# Patient Record
Sex: FEMALE | Race: WHITE
[De-identification: names, ages, dates, MRNs, and addresses within clinical notes are randomized per-mention and may not be internally consistent; named-entity substitution may affect disease eponyms.]

---

## 2021-05-19 ENCOUNTER — HOSPITAL ENCOUNTER (INPATIENT)
Dept: HOSPITAL 96 - M.ERS | Age: 81
LOS: 2 days | Discharge: NURSING, REHAB OR LONG-TERM CARE | DRG: 542 | End: 2021-05-21
Attending: INTERNAL MEDICINE | Admitting: INTERNAL MEDICINE
Payer: MEDICARE

## 2021-05-19 VITALS — DIASTOLIC BLOOD PRESSURE: 87 MMHG | SYSTOLIC BLOOD PRESSURE: 160 MMHG

## 2021-05-19 VITALS — SYSTOLIC BLOOD PRESSURE: 157 MMHG | DIASTOLIC BLOOD PRESSURE: 75 MMHG

## 2021-05-19 VITALS — HEIGHT: 65.98 IN | WEIGHT: 216.01 LBS | BODY MASS INDEX: 34.72 KG/M2

## 2021-05-19 VITALS — DIASTOLIC BLOOD PRESSURE: 81 MMHG | SYSTOLIC BLOOD PRESSURE: 155 MMHG
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
## 2021-05-19 DIAGNOSIS — N17.0: ICD-10-CM
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
## 2021-05-19 DIAGNOSIS — Z20.822: ICD-10-CM

## 2021-05-19 DIAGNOSIS — S82.302A: ICD-10-CM

## 2021-05-19 DIAGNOSIS — Z98.42: ICD-10-CM

## 2021-05-19 DIAGNOSIS — Z79.899: ICD-10-CM

## 2021-05-19 DIAGNOSIS — M80.062A: Primary | ICD-10-CM

## 2021-05-19 DIAGNOSIS — N39.0: ICD-10-CM

## 2021-05-19 DIAGNOSIS — F03.90: ICD-10-CM

## 2021-05-19 DIAGNOSIS — I10: ICD-10-CM
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
## 2021-05-19 DIAGNOSIS — M19.90: ICD-10-CM

## 2021-05-19 DIAGNOSIS — E11.9: ICD-10-CM

## 2021-05-19 DIAGNOSIS — S82.402A: ICD-10-CM

## 2021-05-19 LAB
ABSOLUTE BASOPHILS: 0 THOU/UL (ref 0–0.2)
ABSOLUTE EOSINOPHILS: 0.2 THOU/UL (ref 0–0.7)
ABSOLUTE MONOCYTES: 0.4 THOU/UL (ref 0–1.2)
ALBUMIN SERPL-MCNC: 3.4 G/DL (ref 3.4–5)
ALP SERPL-CCNC: 90 U/L (ref 46–116)
ALT SERPL-CCNC: 16 U/L (ref 30–65)
ANION GAP SERPL CALC-SCNC: 4 MMOL/L (ref 7–16)
APTT BLD: 24.5 SECONDS (ref 25–31.3)
AST SERPL-CCNC: 11 U/L (ref 15–37)
BASOPHILS NFR BLD AUTO: 0.4 %
BILIRUB SERPL-MCNC: 0.5 MG/DL
BILIRUB UR-MCNC: NEGATIVE MG/DL
BUN SERPL-MCNC: 32 MG/DL (ref 7–18)
CALCIUM SERPL-MCNC: 8.2 MG/DL (ref 8.5–10.1)
CHLORIDE SERPL-SCNC: 106 MMOL/L (ref 98–107)
CO2 SERPL-SCNC: 33 MMOL/L (ref 21–32)
COLOR UR: YELLOW
CREAT SERPL-MCNC: 1.8 MG/DL (ref 0.6–1.3)
EOSINOPHIL NFR BLD: 2.4 %
GLUCOSE SERPL-MCNC: 128 MG/DL (ref 70–99)
GRANULOCYTES NFR BLD MANUAL: 79.8 %
HCT VFR BLD CALC: 42.2 % (ref 37–47)
HGB BLD-MCNC: 14.3 GM/DL (ref 12–15)
INR PPP: 1
KETONES UR STRIP-MCNC: NEGATIVE MG/DL
LYMPHOCYTES # BLD: 0.7 THOU/UL (ref 0.8–5.3)
LYMPHOCYTES NFR BLD AUTO: 11.5 %
MCH RBC QN AUTO: 32.6 PG (ref 26–34)
MCHC RBC AUTO-ENTMCNC: 34 G/DL (ref 28–37)
MCV RBC: 95.8 FL (ref 80–100)
MONOCYTES NFR BLD: 5.9 %
MPV: 8.3 FL. (ref 7.2–11.1)
NEUTROPHILS # BLD: 4.9 THOU/UL (ref 1.6–8.1)
NUCLEATED RBCS: 0 /100WBC
PLATELET COUNT*: 141 THOU/UL (ref 150–400)
POTASSIUM SERPL-SCNC: 3.9 MMOL/L (ref 3.5–5.1)
PROT SERPL-MCNC: 6.8 G/DL (ref 6.4–8.2)
PROT UR QL STRIP: (no result)
PROTHROMBIN TIME: 10.9 SECONDS (ref 9.2–11.5)
RBC # BLD AUTO: 4.4 MIL/UL (ref 4.2–5)
RBC # UR STRIP: (no result) /UL
RBC #/AREA URNS HPF: (no result) /HPF (ref 0–2)
RDW-CV: 15.6 % (ref 10.5–14.5)
SODIUM SERPL-SCNC: 143 MMOL/L (ref 136–145)
SP GR UR STRIP: 1.02 (ref 1–1.03)
SQUAMOUS: (no result) /LPF (ref 0–3)
URINE CLARITY: CLEAR
URINE GLUCOSE-RANDOM: NEGATIVE
URINE LEUKOCYTES-REFLEX: (no result)
URINE NITRITE-REFLEX: NEGATIVE
URINE WBC-REFLEX: (no result) /HPF (ref 0–5)
UROBILINOGEN UR STRIP-ACNC: 0.2 E.U./DL (ref 0.2–1)
WBC # BLD AUTO: 6.2 THOU/UL (ref 4–11)

## 2021-05-19 NOTE — EKG
[place] [address]
Phone:  [phone]                     ELECTROCARDIOGRAM REPORT      
_______________________________________________________________________________
 
Name:         RICHA WASHINGTON            Room:          [address] [place] 
[facility]#:    [id number]     Account #:     [id number]  
Admission:    21    Attend Phys:   Arie Robb
Discharge:                Date of Birth: [birth year]  
Date of Service: 21 1313  Report #:      [id number]
        [id number]
_______________________________________________________________________________
THIS REPORT FOR:  //name//                      
 
                         [facility] ED
                                       
Test Date:    2021               Test Time:    13:13:39
Pat Name:     RICHA WASHINGTON         Department:   
Patient ID:   [id number]            Room:         [facility]
Gender:       F                        Technician:   CD
:          1940               Requested By: Elver Adkins
Order Number: [id number] MD:   Cleveland Edwards
                                 Measurements
Intervals                              Axis          
Rate:         76                       P:            70
WY:           186                      QRS:          -39
QRSD:         97                       T:            56
QT:           448                                    
QTc:          504                                    
                           Interpretive Statements
Sinus rhythm with atrial paced beat
Left axis deviation
Low voltage, extremity leads
Abnormal R-wave progression, late transition
 
Compared to ECG 2008 07:26:01
Left-axis deviation now present
Low QRS voltage now present
Prolonged QT interval now present
atrial paced beat noted
Electronically Signed On 2021 15:57:43 CDT by Cleveland Edwards
[email or web address]
 
 
 
 
 
 
 
 
 
 
 
 
 
 
  <ELECTRONICALLY SIGNED>
                                           By: Cleveland Edwards MD, [facility]      
  21     1557
D: 21 1313   _____________________________________
T: 21 1313   Cleveland Edwards MD, [facility]        /EPI

## 2021-05-20 VITALS — DIASTOLIC BLOOD PRESSURE: 75 MMHG | SYSTOLIC BLOOD PRESSURE: 162 MMHG

## 2021-05-20 VITALS — DIASTOLIC BLOOD PRESSURE: 83 MMHG | SYSTOLIC BLOOD PRESSURE: 176 MMHG

## 2021-05-20 VITALS — SYSTOLIC BLOOD PRESSURE: 157 MMHG | DIASTOLIC BLOOD PRESSURE: 78 MMHG

## 2021-05-20 LAB
ANION GAP SERPL CALC-SCNC: 4 MMOL/L (ref 7–16)
BUN SERPL-MCNC: 32 MG/DL (ref 7–18)
CALCIUM SERPL-MCNC: 7.5 MG/DL (ref 8.5–10.1)
CHLORIDE SERPL-SCNC: 109 MMOL/L (ref 98–107)
CO2 SERPL-SCNC: 30 MMOL/L (ref 21–32)
CREAT SERPL-MCNC: 1.6 MG/DL (ref 0.6–1.3)
GLUCOSE SERPL-MCNC: 141 MG/DL (ref 70–99)
HCT VFR BLD CALC: 35 % (ref 37–47)
HGB BLD-MCNC: 11.9 GM/DL (ref 12–15)
MCH RBC QN AUTO: 32.5 PG (ref 26–34)
MCHC RBC AUTO-ENTMCNC: 33.9 G/DL (ref 28–37)
MCV RBC: 95.9 FL (ref 80–100)
MPV: 8.2 FL. (ref 7.2–11.1)
PLATELET COUNT*: 114 THOU/UL (ref 150–400)
POTASSIUM SERPL-SCNC: 4.1 MMOL/L (ref 3.5–5.1)
RBC # BLD AUTO: 3.65 MIL/UL (ref 4.2–5)
RDW-CV: 15.9 % (ref 10.5–14.5)
SODIUM SERPL-SCNC: 143 MMOL/L (ref 136–145)
WBC # BLD AUTO: 4.2 THOU/UL (ref 4–11)

## 2021-05-21 VITALS — SYSTOLIC BLOOD PRESSURE: 166 MMHG | DIASTOLIC BLOOD PRESSURE: 72 MMHG

## 2022-07-21 NOTE — EMS
[facility] 
[address] [place]  [address]                    EMS Patient Care Report       
_______________________________________________________________________________
 
Name:       RAMA BYRNES             Room:                      PRE ER  
M..#:  [id number]      Account #:      [id number]  
Admission:               Attend Phys:                         
Discharge:               Date of Birth:  [birth year]  
         Report #: [id number]
                                                                     [id number]
_______________________________________________________________________________
THIS REPORT FOR:  //name//                      
 
Report Transmitted: 2021 12:18
EMS Care Summary
[facility] [place]
Incident [id number] @ 2021 11:51
 
Incident Location
[address] [place] [address]
 
Patient
Rama Byrnes
Female, 80 Years
 1940
 
Patient Address
[address] [place] [address]
 
Patient History
Unspecified dementia,Chronic Obstructive Pulmonary Disease (COPD),Endocrine 
Condition - Other,Hypothyroidism, unspecified,Gastro-Esophageal Reflux Disease 
(GERD),Hyperlipidemia, 
 
Patient Allergies
No known allergies,
 
 
Chief Complaint
Pain-extremity lower
 
Disposition
Transported No Lights/[place]
 
Dispatch Reason
Sick Person
 
Transported To
[facility]
 
Narrative
Dispatched to address noted for a "non-traumatic fracture."  en route at 
time noted. Arrived and found patient without staff in her room. patient was 
obviously confused and laying in bed. Staff was found and they reported a 
fracture to the patients left lower leg. Patient had dementia and a short 
 
 
 
[facility] 
[address] [place]  [address]                    EMS Patient Care Report       
_______________________________________________________________________________
 
Name:       RAMA BYRNES             Room:                      PRE ER  
M.R.#:  [id number]      Account #:      [id number]  
Admission:               Attend Phys:                         
Discharge:               Date of Birth:  [birth year]  
         Report #: [id number]
                                                                     [id number]
_______________________________________________________________________________
memory. Staff stated that the do not know how the fracture occurred but they 
believe it was in some kind of transfer accident. Patient had deformities to 
the lower left leg and stated that pain with movement, but would quickly say 
she forgot it was broken. Patient was splinted with a RASHAWN splint and then 
transferred to stretcher and buckled in. Once staff left I asked the patients 
room mate if she noticed anything and she stated that last night the staff 
moved the patient to the bed from her wheelchair without a nash lift. Patient 
is supposed to be moved via nash lift only. Once in ambulance, vitals where 
taken at time noted. [facility] was chosen and transport was 
started. While en route, vitals where taken again and no major change was noted 
to patient. patient complained of pain with movement and no pain without. Radio 
report was given at time noted. Arrived and took patient to room. Patient was 
moved to bed and RN was given verbal report. RN signed for patient and patient 
care. END REPORT EMT-P Hiram Love 
 
Initial Vitals
@11:57Pain: 8/10,
@12:25Pain: 010,
@12:12SpO2: 97,
@12:12P: 79,R: 16,BP: 185/87,Revised Trauma: 8,
@12:28P: 76,R: 16,BP: 188/83,Revised Trauma: 8,
@12:12GCS: 14,
@12:28GCS: 14,
@11:57
 
Assessments
@11:57MENTAL:SKIN:HEENT:LUNG 
SOUNDS:ABDOMEN:PELVIS//GI:EXTREMITIES:PULSE:NEURO: 
 
Impression
Acute Pain, not elsewhere classified
 
 
Timeline
11:51,Call Received
11:51,Dispatch Notified
11:51,Psap Call
11:51,Dispatched
11:52,En Route
11:56,On Scene
11:57,At Patient
11:57,BP: / M,PULSE: ,RR:  R,SPO2:  Ox,ETCO2:  ,BG: ,PAIN: 8,GCS: ,
11:57,BP: / M,PULSE: ,RR:  R,SPO2:  Ox,ETCO2:  ,BG: ,PAIN: ,GCS: ,
12:12,BP: / M,PULSE: ,RR:  R,SPO2: 97 Ox,ETCO2:  ,BG: ,PAIN: ,GCS: ,
12:12,BP: 185/87 M,PULSE: 79,RR: 16 R,SPO2:  Ox,ETCO2:  ,BG: ,PAIN: ,GCS: ,
12:12,BP: / M,PULSE: ,RR:  R,SPO2:  Ox,ETCO2:  ,BG: ,PAIN: ,GCS: 14,
 
 
 
[place] [address]                    EMS Patient Care Report       
_______________________________________________________________________________
 
Name:       PADMINIRAMA             Room:                      PRE ER  
M.R.#:  [id number]      Account #:      [id number]  
Admission:               Attend Phys:                         
Discharge:               Date of Birth:  [birth year]  
         Report #: [id number]
                                                                     [id number]
_______________________________________________________________________________
12:16,Depart Scene
12:25,BP: / M,PULSE: ,RR:  R,SPO2:  Ox,ETCO2:  ,BG: ,PAIN: 0,GCS: ,
12:28,BP: 188/83 M,PULSE: 76,RR: 16 R,SPO2:  Ox,ETCO2:  ,BG: ,PAIN: ,GCS: ,
12:28,BP: / M,PULSE: ,RR:  R,SPO2:  Ox,ETCO2:  ,BG: ,PAIN: ,GCS: 14,
12:36,At Destination
12:57,Call Closed
 
Disclaimer
v1.1     Copyright  [employer]
This EMS Care Summary contains data elements from the applicable legal record 
(which may be displayed differently). It is designed to provide pertinent 
information for the following purposes: continuity of care, clinical quality, 
and state data reporting. The complete legal record is available to ED staff 
and administrators of the receiving hospital in [facility] Patient Tracker. All data 
is provided "as is."
Samples Given: Dr Conte
Detail Level: Zone